# Patient Record
Sex: FEMALE | Race: WHITE
[De-identification: names, ages, dates, MRNs, and addresses within clinical notes are randomized per-mention and may not be internally consistent; named-entity substitution may affect disease eponyms.]

---

## 2020-05-07 ENCOUNTER — EMPLOYEE HEALTH (OUTPATIENT)
Dept: OTHER | Age: 45
End: 2020-05-07

## 2020-05-07 DIAGNOSIS — Z20.822 SUSPECTED COVID-19 VIRUS INFECTION: Primary | ICD-10-CM

## 2020-05-08 ENCOUNTER — EMPLOYEE HEALTH (OUTPATIENT)
Dept: OTHER | Age: 45
End: 2020-05-08

## 2020-05-08 ENCOUNTER — LAB SERVICES (OUTPATIENT)
Dept: LAB | Age: 45
End: 2020-05-08

## 2020-05-08 DIAGNOSIS — Z20.822 SUSPECTED COVID-19 VIRUS INFECTION: ICD-10-CM

## 2020-05-08 DIAGNOSIS — Z20.822 SUSPECTED COVID-19 VIRUS INFECTION: Primary | ICD-10-CM

## 2020-05-08 LAB
SARS-COV-2 RNA SPEC QL NAA+PROBE: NOT DETECTED
SERVICE CMNT-IMP: NORMAL
SPECIMEN SOURCE: NORMAL

## 2020-05-08 PROCEDURE — 87635 SARS-COV-2 COVID-19 AMP PRB: CPT | Performed by: HOSPITALIST

## 2020-05-11 ENCOUNTER — EMPLOYEE HEALTH (OUTPATIENT)
Dept: OTHER | Age: 45
End: 2020-05-11

## 2020-05-29 ENCOUNTER — EMPLOYEE HEALTH (OUTPATIENT)
Dept: OTHER | Age: 45
End: 2020-05-29

## 2020-06-05 ENCOUNTER — EMPLOYEE HEALTH (OUTPATIENT)
Dept: OTHER | Age: 45
End: 2020-06-05

## 2020-06-05 DIAGNOSIS — Z11.59 SCREENING FOR VIRAL DISEASE: Primary | ICD-10-CM

## 2020-06-16 ENCOUNTER — EMPLOYEE HEALTH (OUTPATIENT)
Dept: OTHER | Age: 45
End: 2020-06-16

## 2021-01-22 ENCOUNTER — IMMUNIZATION (OUTPATIENT)
Dept: LAB | Age: 46
End: 2021-01-22

## 2021-01-22 DIAGNOSIS — Z23 NEED FOR VACCINATION: Primary | ICD-10-CM

## 2021-01-22 PROCEDURE — 91300 COVID 19 PFIZER-BIONTECH: CPT

## 2021-01-22 PROCEDURE — 0001A COVID 19 PFIZER-BIONTECH: CPT

## 2021-02-19 ENCOUNTER — IMMUNIZATION (OUTPATIENT)
Dept: LAB | Age: 46
End: 2021-02-19

## 2021-02-19 DIAGNOSIS — Z23 NEED FOR VACCINATION: Primary | ICD-10-CM

## 2021-02-19 PROCEDURE — 0002A COVID 19 PFIZER-BIONTECH: CPT

## 2021-02-19 PROCEDURE — 91300 COVID 19 PFIZER-BIONTECH: CPT

## 2021-12-01 ENCOUNTER — TELEPHONE (OUTPATIENT)
Dept: GENETICS | Age: 46
End: 2021-12-01

## 2021-12-29 ENCOUNTER — EMPLOYEE HEALTH (OUTPATIENT)
Dept: OTHER | Age: 46
End: 2021-12-29

## 2022-07-21 ENCOUNTER — OFFICE VISIT (OUTPATIENT)
Dept: SURGERY | Facility: CLINIC | Age: 47
End: 2022-07-21
Payer: COMMERCIAL

## 2022-07-21 VITALS
SYSTOLIC BLOOD PRESSURE: 122 MMHG | OXYGEN SATURATION: 99 % | HEART RATE: 107 BPM | BODY MASS INDEX: 29.79 KG/M2 | DIASTOLIC BLOOD PRESSURE: 70 MMHG | HEIGHT: 62.5 IN | WEIGHT: 166 LBS

## 2022-07-21 DIAGNOSIS — R63.5 WEIGHT GAIN: ICD-10-CM

## 2022-07-21 DIAGNOSIS — E78.5 DYSLIPIDEMIA: ICD-10-CM

## 2022-07-21 DIAGNOSIS — G70.00 MYASTHENIA GRAVIS (HCC): ICD-10-CM

## 2022-07-21 DIAGNOSIS — J45.909 UNCOMPLICATED ASTHMA, UNSPECIFIED ASTHMA SEVERITY, UNSPECIFIED WHETHER PERSISTENT: ICD-10-CM

## 2022-07-21 DIAGNOSIS — E66.3 PATIENT OVERWEIGHT: ICD-10-CM

## 2022-07-21 DIAGNOSIS — I10 HYPERTENSION, UNSPECIFIED TYPE: Primary | ICD-10-CM

## 2022-07-21 PROCEDURE — 3008F BODY MASS INDEX DOCD: CPT | Performed by: NURSE PRACTITIONER

## 2022-07-21 PROCEDURE — 3074F SYST BP LT 130 MM HG: CPT | Performed by: NURSE PRACTITIONER

## 2022-07-21 PROCEDURE — 99204 OFFICE O/P NEW MOD 45 MIN: CPT | Performed by: NURSE PRACTITIONER

## 2022-07-21 PROCEDURE — 3078F DIAST BP <80 MM HG: CPT | Performed by: NURSE PRACTITIONER

## 2022-07-21 RX ORDER — LISINOPRIL AND HYDROCHLOROTHIAZIDE 20; 12.5 MG/1; MG/1
1 TABLET ORAL DAILY
COMMUNITY
Start: 2022-05-21

## 2022-07-21 RX ORDER — MONTELUKAST SODIUM 10 MG/1
1 TABLET ORAL EVERY MORNING
COMMUNITY
Start: 2022-07-11

## 2022-07-21 RX ORDER — FLUTICASONE PROPIONATE 110 UG/1
2 AEROSOL, METERED RESPIRATORY (INHALATION) 2 TIMES DAILY
COMMUNITY
Start: 2021-04-15

## 2022-07-21 RX ORDER — ALBUTEROL SULFATE 90 UG/1
2 AEROSOL, METERED RESPIRATORY (INHALATION) EVERY 6 HOURS PRN
COMMUNITY
Start: 2022-05-12

## 2022-07-21 RX ORDER — PHENTERMINE HYDROCHLORIDE 37.5 MG/1
37.5 TABLET ORAL
Qty: 30 TABLET | Refills: 1 | Status: SHIPPED | OUTPATIENT
Start: 2022-07-21

## 2022-07-21 RX ORDER — TOPIRAMATE 25 MG/1
25 TABLET ORAL DAILY
Qty: 30 TABLET | Refills: 0 | Status: SHIPPED | OUTPATIENT
Start: 2022-07-21

## 2022-07-21 RX ORDER — FEXOFENADINE HCL 180 MG/1
180 TABLET ORAL AS NEEDED
COMMUNITY

## 2022-07-21 RX ORDER — PREDNISONE 20 MG/1
20 TABLET ORAL EVERY 12 HOURS
COMMUNITY
Start: 2020-01-02

## 2022-07-21 RX ORDER — NORETHINDRONE ACETATE AND ETHINYL ESTRADIOL, ETHINYL ESTRADIOL AND FERROUS FUMARATE 1MG-10(24)
1 KIT ORAL DAILY
COMMUNITY
Start: 2022-05-12

## 2022-07-21 NOTE — PATIENT INSTRUCTIONS
Magnesium glycinate 200 to 500 mg/day for sleep. Pure Encapsulations. Life Extension. NOW. Garden of Life. Or consider Natural Calm. by Elda Platt  Follow dosage instructions. Start 1 teaspoon and increase up to 3 teaspoons as needed for sleep. Record food and drink intake on a written log or phone nas:   Aim for  grams of carbs/day. Aim for 60 grams of protein/day. Aim for a whole, plant strong, low glycemic index dietary pattern. Aim for protein + produce at each meal time. Keep meals between 7 am and 7 pm.    Aim for 3 healthy meals a day with 1-2 healthy snacks as needed. Daily- Aim for:  2 fruits: tomato, avocado, apples, oranges, berries   4 handfuls non-starchy vegetables: greens, peppers, onion, garlic, broccoli, cauliflower, asparagus, brussels sprouts   2 starches: real potato (sweet, white), green beans, carrots, corn, beans, lentils, chickpeas, quinoa, cous cous  3 protein per day: nuts, seeds, plants (lentils, chickpeas, beans), chicken, fish, seafood, turkey, pork, red meat (limited)  Aim for 2 servings healthy fats per day: olives, fatty fish, olive oil, seeds, nuts, avocado, coconut oil. Breakfast ideas:  1. Fruit and nuts/seeds. 2. Eggs scrambled with vegetables, cottage cheese. 3. Oatmeal (stovetop), cinnamon, 2 tbsp flaxseed, berries. 4. Protein shake: 1-2 scoops protein powder, shake with ice and water. Aim for protein and vegetables for lunch and dinner. Healthy Plate method:   1/2 vegetables, 1/4 protein, 1/4 healthy starch (may decrease this portion). Snacks:  Raw vegetables and hummus, apples and peanut butter, nuts, seeds, fruit, pecans with organic honey drizzled. 1. Do a house cleanse, remove unhealthy foods from the house. 2. Aim to eat a whole, plant strong, low glycemic index diet. 3. Focus on the quality of your diet.   4. Get in the habit of recording everything you eat and drink using a food log.  5. Write down all your meals/drinks to stay accountable for what you eat and drink. 6. Find a regular pattern for meals. Aim for 3 healthy meals a day with 1-2 healthy snacks. 7. Plan when, where, and what you will eat at each meal in advance to make sure you do not go too many hours without eating. 8. Include lean protein throughout the day to help steady your appetite. 9. Eat at least 4 servings of vegetables and 2 servings for fruits each day. 10. Add fiber to slow down digestion and keep you full longer. 11. Cut out sugar sweetened beverages. 12. Avoid highly processed carbohydrates. 13. Use the healthy plate method as a reference: Lunch & Dinner plate: 1/2 vegetables (and some fruit), 1/4 protein, 1/4 healthy starch (may decrease this portion). 14. Aim to lose 1 to 2 lbs per week. 15. Become more physically active. Aim for 150 minutes of moderate/vigorous activity per week. 16. Aim to sleep 7-8 hours per night. 17. Stress less. Meditate. 25. Connect with others, loneliness can contribute to disease. 19. Aim to drink at least 64 oz of water a day, you can increase this to half your body weight in ounces/day. Daily Calories:  Track food and beverage intake daily on a phone nas: My Fitness Pal, Carb Manager, Lose It, My Net Diary   120-174 lbs: 1200 calories/day. 175-219 lbs: 1500 calories/day. 220-249 lbs: 1800 calories/day. 250 lbs or more: 2,000 calories/day. Start 1/2 tablet phentermine daily in the AM.    After 2 weeks then start topiramate.

## 2022-07-22 ENCOUNTER — EKG ENCOUNTER (OUTPATIENT)
Dept: LAB | Age: 47
End: 2022-07-22
Attending: NURSE PRACTITIONER
Payer: COMMERCIAL

## 2022-07-22 DIAGNOSIS — R63.5 WEIGHT GAIN: ICD-10-CM

## 2022-07-22 DIAGNOSIS — E66.3 PATIENT OVERWEIGHT: ICD-10-CM

## 2022-07-22 DIAGNOSIS — I10 HYPERTENSION, UNSPECIFIED TYPE: ICD-10-CM

## 2022-07-22 DIAGNOSIS — E78.5 DYSLIPIDEMIA: ICD-10-CM

## 2022-07-22 DIAGNOSIS — J45.909 UNCOMPLICATED ASTHMA, UNSPECIFIED ASTHMA SEVERITY, UNSPECIFIED WHETHER PERSISTENT: ICD-10-CM

## 2022-07-22 DIAGNOSIS — G70.00 MYASTHENIA GRAVIS (HCC): ICD-10-CM

## 2022-07-22 PROCEDURE — 93005 ELECTROCARDIOGRAM TRACING: CPT

## 2022-07-22 PROCEDURE — 93010 ELECTROCARDIOGRAM REPORT: CPT | Performed by: NURSE PRACTITIONER

## 2022-08-15 RX ORDER — PHENTERMINE HYDROCHLORIDE 37.5 MG/1
37.5 TABLET ORAL
Qty: 30 TABLET | Refills: 1 | Status: SHIPPED | OUTPATIENT
Start: 2022-08-15

## 2022-08-15 RX ORDER — TOPIRAMATE 25 MG/1
25 TABLET ORAL DAILY
Qty: 30 TABLET | Refills: 0 | Status: SHIPPED | OUTPATIENT
Start: 2022-08-15

## 2022-08-31 ENCOUNTER — TELEMEDICINE (OUTPATIENT)
Dept: SURGERY | Facility: CLINIC | Age: 47
End: 2022-08-31

## 2022-08-31 VITALS — BODY MASS INDEX: 28 KG/M2 | WEIGHT: 153 LBS

## 2022-08-31 DIAGNOSIS — I10 HYPERTENSION, UNSPECIFIED TYPE: ICD-10-CM

## 2022-08-31 DIAGNOSIS — G70.00 MYASTHENIA GRAVIS (HCC): ICD-10-CM

## 2022-08-31 DIAGNOSIS — E66.3 PATIENT OVERWEIGHT: ICD-10-CM

## 2022-08-31 DIAGNOSIS — E78.5 DYSLIPIDEMIA: Primary | ICD-10-CM

## 2022-08-31 DIAGNOSIS — J45.909 UNCOMPLICATED ASTHMA, UNSPECIFIED ASTHMA SEVERITY, UNSPECIFIED WHETHER PERSISTENT: ICD-10-CM

## 2022-08-31 PROCEDURE — 99214 OFFICE O/P EST MOD 30 MIN: CPT | Performed by: NURSE PRACTITIONER

## 2022-08-31 RX ORDER — TOPIRAMATE 25 MG/1
25 TABLET ORAL DAILY
Qty: 90 TABLET | Refills: 0 | Status: SHIPPED | OUTPATIENT
Start: 2022-08-31

## 2022-10-06 ENCOUNTER — OFFICE VISIT (OUTPATIENT)
Dept: SURGERY | Facility: CLINIC | Age: 47
End: 2022-10-06
Payer: COMMERCIAL

## 2022-10-06 VITALS
BODY MASS INDEX: 26.55 KG/M2 | WEIGHT: 148 LBS | HEIGHT: 62.5 IN | HEART RATE: 95 BPM | DIASTOLIC BLOOD PRESSURE: 70 MMHG | SYSTOLIC BLOOD PRESSURE: 110 MMHG | OXYGEN SATURATION: 99 %

## 2022-10-06 DIAGNOSIS — E66.3 PATIENT OVERWEIGHT: ICD-10-CM

## 2022-10-06 DIAGNOSIS — I10 HYPERTENSION, UNSPECIFIED TYPE: ICD-10-CM

## 2022-10-06 DIAGNOSIS — E78.5 DYSLIPIDEMIA: Primary | ICD-10-CM

## 2022-10-06 DIAGNOSIS — G70.00 MYASTHENIA GRAVIS (HCC): ICD-10-CM

## 2022-10-06 DIAGNOSIS — J45.909 UNCOMPLICATED ASTHMA, UNSPECIFIED ASTHMA SEVERITY, UNSPECIFIED WHETHER PERSISTENT: ICD-10-CM

## 2022-10-06 PROCEDURE — 99214 OFFICE O/P EST MOD 30 MIN: CPT | Performed by: NURSE PRACTITIONER

## 2022-10-06 PROCEDURE — 3008F BODY MASS INDEX DOCD: CPT | Performed by: NURSE PRACTITIONER

## 2022-10-06 PROCEDURE — 3074F SYST BP LT 130 MM HG: CPT | Performed by: NURSE PRACTITIONER

## 2022-10-06 PROCEDURE — 3078F DIAST BP <80 MM HG: CPT | Performed by: NURSE PRACTITIONER

## 2022-10-06 NOTE — PATIENT INSTRUCTIONS
Daily Calories:  120-174 lbs: 1200 calories/day. 175-219 lbs: 1500 calories/day. 220-249 lbs: 1800 calories/day. 250 lbs or more: 2,000 calories/day.

## 2022-10-26 RX ORDER — PHENTERMINE HYDROCHLORIDE 37.5 MG/1
37.5 TABLET ORAL
Qty: 30 TABLET | Refills: 2 | Status: SHIPPED | OUTPATIENT
Start: 2022-10-26

## 2023-01-05 ENCOUNTER — OFFICE VISIT (OUTPATIENT)
Dept: SURGERY | Facility: CLINIC | Age: 48
End: 2023-01-05
Payer: COMMERCIAL

## 2023-01-05 VITALS
SYSTOLIC BLOOD PRESSURE: 115 MMHG | OXYGEN SATURATION: 99 % | DIASTOLIC BLOOD PRESSURE: 73 MMHG | WEIGHT: 134 LBS | BODY MASS INDEX: 24.04 KG/M2 | HEART RATE: 114 BPM | HEIGHT: 62.5 IN

## 2023-01-05 DIAGNOSIS — G70.00 MYASTHENIA GRAVIS (HCC): ICD-10-CM

## 2023-01-05 DIAGNOSIS — J45.909 UNCOMPLICATED ASTHMA, UNSPECIFIED ASTHMA SEVERITY, UNSPECIFIED WHETHER PERSISTENT: ICD-10-CM

## 2023-01-05 DIAGNOSIS — I10 HYPERTENSION, UNSPECIFIED TYPE: ICD-10-CM

## 2023-01-05 DIAGNOSIS — E78.5 DYSLIPIDEMIA: Primary | ICD-10-CM

## 2023-01-05 PROCEDURE — 3074F SYST BP LT 130 MM HG: CPT | Performed by: NURSE PRACTITIONER

## 2023-01-05 PROCEDURE — 3008F BODY MASS INDEX DOCD: CPT | Performed by: NURSE PRACTITIONER

## 2023-01-05 PROCEDURE — 3078F DIAST BP <80 MM HG: CPT | Performed by: NURSE PRACTITIONER

## 2023-01-05 PROCEDURE — 99214 OFFICE O/P EST MOD 30 MIN: CPT | Performed by: NURSE PRACTITIONER

## 2023-01-05 RX ORDER — TOPIRAMATE 25 MG/1
25 TABLET ORAL 2 TIMES DAILY
Qty: 180 TABLET | Refills: 0 | Status: SHIPPED | OUTPATIENT
Start: 2023-01-05

## 2023-01-05 RX ORDER — PHENTERMINE HYDROCHLORIDE 37.5 MG/1
37.5 TABLET ORAL
Qty: 30 TABLET | Refills: 2 | Status: SHIPPED | OUTPATIENT
Start: 2023-01-05

## 2023-04-06 ENCOUNTER — OFFICE VISIT (OUTPATIENT)
Dept: SURGERY | Facility: CLINIC | Age: 48
End: 2023-04-06
Payer: COMMERCIAL

## 2023-04-06 VITALS
WEIGHT: 127 LBS | DIASTOLIC BLOOD PRESSURE: 86 MMHG | OXYGEN SATURATION: 100 % | HEART RATE: 113 BPM | BODY MASS INDEX: 22.79 KG/M2 | HEIGHT: 62.5 IN | SYSTOLIC BLOOD PRESSURE: 141 MMHG

## 2023-04-06 DIAGNOSIS — E78.5 DYSLIPIDEMIA: Primary | ICD-10-CM

## 2023-04-06 DIAGNOSIS — I10 HYPERTENSION, UNSPECIFIED TYPE: ICD-10-CM

## 2023-04-06 DIAGNOSIS — J45.909 UNCOMPLICATED ASTHMA, UNSPECIFIED ASTHMA SEVERITY, UNSPECIFIED WHETHER PERSISTENT: ICD-10-CM

## 2023-04-06 DIAGNOSIS — G70.00 MYASTHENIA GRAVIS (HCC): ICD-10-CM

## 2023-04-06 PROCEDURE — 3008F BODY MASS INDEX DOCD: CPT | Performed by: NURSE PRACTITIONER

## 2023-04-06 PROCEDURE — 3079F DIAST BP 80-89 MM HG: CPT | Performed by: NURSE PRACTITIONER

## 2023-04-06 PROCEDURE — 3077F SYST BP >= 140 MM HG: CPT | Performed by: NURSE PRACTITIONER

## 2023-04-06 PROCEDURE — 99214 OFFICE O/P EST MOD 30 MIN: CPT | Performed by: NURSE PRACTITIONER

## 2023-04-06 RX ORDER — PHENTERMINE HYDROCHLORIDE 37.5 MG/1
37.5 TABLET ORAL
Qty: 30 TABLET | Refills: 2 | Status: SHIPPED | OUTPATIENT
Start: 2023-04-06

## 2023-04-06 RX ORDER — TOPIRAMATE 25 MG/1
25 TABLET ORAL 2 TIMES DAILY
Qty: 180 TABLET | Refills: 1 | Status: SHIPPED | OUTPATIENT
Start: 2023-04-06

## 2023-06-29 ENCOUNTER — OFFICE VISIT (OUTPATIENT)
Dept: SURGERY | Facility: CLINIC | Age: 48
End: 2023-06-29
Payer: COMMERCIAL

## 2023-06-29 VITALS
HEIGHT: 62.5 IN | HEART RATE: 94 BPM | DIASTOLIC BLOOD PRESSURE: 75 MMHG | OXYGEN SATURATION: 99 % | WEIGHT: 125 LBS | BODY MASS INDEX: 22.43 KG/M2 | SYSTOLIC BLOOD PRESSURE: 122 MMHG

## 2023-06-29 DIAGNOSIS — J45.909 UNCOMPLICATED ASTHMA, UNSPECIFIED ASTHMA SEVERITY, UNSPECIFIED WHETHER PERSISTENT: ICD-10-CM

## 2023-06-29 DIAGNOSIS — G70.00 MYASTHENIA GRAVIS (HCC): ICD-10-CM

## 2023-06-29 DIAGNOSIS — I10 HYPERTENSION, UNSPECIFIED TYPE: ICD-10-CM

## 2023-06-29 DIAGNOSIS — E78.5 DYSLIPIDEMIA: Primary | ICD-10-CM

## 2023-06-29 PROCEDURE — 3008F BODY MASS INDEX DOCD: CPT | Performed by: NURSE PRACTITIONER

## 2023-06-29 PROCEDURE — 3074F SYST BP LT 130 MM HG: CPT | Performed by: NURSE PRACTITIONER

## 2023-06-29 PROCEDURE — 3078F DIAST BP <80 MM HG: CPT | Performed by: NURSE PRACTITIONER

## 2023-06-29 PROCEDURE — 99214 OFFICE O/P EST MOD 30 MIN: CPT | Performed by: NURSE PRACTITIONER

## 2023-09-25 ENCOUNTER — OFFICE VISIT (OUTPATIENT)
Dept: SURGERY | Facility: CLINIC | Age: 48
End: 2023-09-25
Payer: COMMERCIAL

## 2023-09-25 VITALS
HEART RATE: 80 BPM | SYSTOLIC BLOOD PRESSURE: 118 MMHG | HEIGHT: 62.4 IN | WEIGHT: 123 LBS | DIASTOLIC BLOOD PRESSURE: 80 MMHG | BODY MASS INDEX: 22.35 KG/M2 | OXYGEN SATURATION: 100 %

## 2023-09-25 DIAGNOSIS — I10 HYPERTENSION, UNSPECIFIED TYPE: ICD-10-CM

## 2023-09-25 DIAGNOSIS — G70.00 MYASTHENIA GRAVIS (HCC): ICD-10-CM

## 2023-09-25 DIAGNOSIS — E78.5 DYSLIPIDEMIA: Primary | ICD-10-CM

## 2023-09-25 DIAGNOSIS — J45.909 UNCOMPLICATED ASTHMA, UNSPECIFIED ASTHMA SEVERITY, UNSPECIFIED WHETHER PERSISTENT: ICD-10-CM

## 2023-09-25 DIAGNOSIS — Z51.81 ENCOUNTER FOR THERAPEUTIC DRUG MONITORING: ICD-10-CM

## 2023-09-25 PROCEDURE — 99214 OFFICE O/P EST MOD 30 MIN: CPT | Performed by: NURSE PRACTITIONER

## 2023-09-25 PROCEDURE — 3074F SYST BP LT 130 MM HG: CPT | Performed by: NURSE PRACTITIONER

## 2023-09-25 PROCEDURE — 3079F DIAST BP 80-89 MM HG: CPT | Performed by: NURSE PRACTITIONER

## 2023-09-25 PROCEDURE — 3008F BODY MASS INDEX DOCD: CPT | Performed by: NURSE PRACTITIONER

## 2023-09-25 NOTE — PATIENT INSTRUCTIONS
Aidee Carcamo's multi-vitamin. 0758-3604 units Vitamin D. Magnesium glycinate 250-500/day. Aim for 45 grams protein/day. 15-20 grams/meal.     3 PM nuts/seeds. Eat within 1-3 hours upon waking. Meals between 7 or 9 AM and 7 PM.     6 days on, 1 day off. [facility] for tracking. Add psyllium husk daily. [place]. Build up to 35-40 grams of fiber/day. Aim for 64 oz of water/day. Aim for a total of:  2 fruits a day (avocado, tomato, citrus/oranges, apples, berries)  1/2 cup or medium size    4 non-starchy vegetables/day (1/2 cup cooked; 1 cup raw) (greens, peppers, onions, garlic, broccoli, cauliflower, brussels sprouts, asparagus, etc.)    0-2 starches/day (oatmeal, sweet potato, carrots, brown rice, etc). 3 protein per day (fish, seafood, meat, or plants: salmon, nuts, seeds, shrimp, chicken, turkey, beans, lentils, chickpeas, etc). 2 healthy fats (avocado, avocado oil, olives, olive oil, salmon, nuts, seeds)    I recommend a whole food, plant powered diet with low glycemic index:     Aim for 3 meals a day and 1-2 snacks as needed. Aim for a protein + produce at each meal time. Breakfast ideas:  1. Fruit and nuts/seeds. 2. Eggs scrambled with vegetables. 3. Oatmeal (stovetop), cinnamon, 2 tbsp flaxseed, berries, nuts. 4. Protein shake + fruit. (1 scoop with water/ice). Garden of [place] [address], [place], [place], and [place] are good brands. Snacks:  Raw vegetables and hummus, apples and peanut butter, nuts, seeds, fruit, pecans drizzled with organic honey. Use the Healthy Plate method for lunch and dinner:  1/2 right side of plate non-starchy vegetables. Bottom left 1/4 plate protein. Top left 1/4 starch as desired. Aim for 150 minutes moderate level exercise weekly with 2-3 days strength training. Add Magnesium glycinate 200 to 500 mg/day for sleep. Life Extension. NOW. Garden of Life. Whole Food Brand. Starla's. Pure Encapsulations.      Or consider Natural Calm. by Paloma Teersa  Follow dosage instructions. Start 1 teaspoon and increase up to 3 teaspoons as needed for sleep.

## 2023-10-18 ENCOUNTER — TELEPHONE (OUTPATIENT)
Dept: SURGERY | Facility: CLINIC | Age: 48
End: 2023-10-18

## 2023-10-25 RX ORDER — TOPIRAMATE 25 MG/1
25 TABLET ORAL 2 TIMES DAILY
Qty: 180 TABLET | Refills: 1 | Status: SHIPPED | OUTPATIENT
Start: 2023-10-25

## 2023-12-08 RX ORDER — PHENTERMINE HYDROCHLORIDE 37.5 MG/1
37.5 TABLET ORAL
Qty: 30 TABLET | Refills: 3 | Status: SHIPPED | OUTPATIENT
Start: 2023-12-08

## 2024-02-21 ENCOUNTER — TELEMEDICINE (OUTPATIENT)
Dept: SURGERY | Facility: CLINIC | Age: 49
End: 2024-02-21
Payer: COMMERCIAL

## 2024-02-21 VITALS — BODY MASS INDEX: 23 KG/M2 | WEIGHT: 124.81 LBS

## 2024-02-21 DIAGNOSIS — Z51.81 ENCOUNTER FOR THERAPEUTIC DRUG MONITORING: ICD-10-CM

## 2024-02-21 DIAGNOSIS — E66.3 PATIENT OVERWEIGHT: ICD-10-CM

## 2024-02-21 DIAGNOSIS — I10 HYPERTENSION, UNSPECIFIED TYPE: ICD-10-CM

## 2024-02-21 DIAGNOSIS — J45.909 UNCOMPLICATED ASTHMA, UNSPECIFIED ASTHMA SEVERITY, UNSPECIFIED WHETHER PERSISTENT (HCC): ICD-10-CM

## 2024-02-21 DIAGNOSIS — E78.5 DYSLIPIDEMIA: Primary | ICD-10-CM

## 2024-02-21 DIAGNOSIS — G70.00 MYASTHENIA GRAVIS (HCC): ICD-10-CM

## 2024-02-21 PROCEDURE — 99214 OFFICE O/P EST MOD 30 MIN: CPT | Performed by: NURSE PRACTITIONER

## 2024-02-21 NOTE — PROGRESS NOTES
Virtual Video & Audio Check-In    Mariana Sellers verbally consents to a Virtual Video & Audio Check-In visit on 24.  Patient has been referred to the [facility] website at [email or web address] to review the yearly Consent to Treat document.    Patient understands and accepts financial responsibility for any deductible, co-insurance and/or co-pays associated with this service.    Duration of the service: 13 minutes.    Summary of topics discussed: Obesity/weight management, Lifestyle and behavior modifications, Medication management.               Patient:  Mariana Sellers  :      1975  MRN:      [id number]    Chief Complaint:    Chief Complaint   Patient presents with    Follow - Up    Weight Management       SUBJECTIVE     History of Present Illness:  Mariana is being seen today for a follow-up for medically supported weight loss.     Initial HPI:  46 yo female.   Reports weight gain began 2018, dad passed away suddenly and then pandemic hit.   Desires guidance on lifestyle modifications.      Patient is considering medications and is not a candidate for bariatric surgery for weight loss.     Patient denies any history of eating disorder(s).     Patient is employed: [employer].   Patient lives with spouse, 2 kids (14, 16).      Patient's goal weight: 130 lbs  Biggest weight loss in the past:  How weight loss was achieved:  Heaviest weight ever: Current   Previous use of medical weight loss medications: None      Physical activity: Walks     Sleep: Night shift     Sleep screening:     Past Medical History:   Past Medical History:   Diagnosis Date    Hypertension     Myasthenia gravis (HCC)         Comorbidities:  Asthma-Improvement?  yes and Hypertension-Improvement?  yes    OBJECTIVE     Vitals: Wt 124 lb 12.8 oz (56.6 kg)   BMI 22.53 kg/m²     Initial weight loss:    Total weight loss: -41   Start weight: 166    Wt Readings from Last 6 Encounters:   24 124 lb 12.8 oz (56.6 kg)    23 123 lb (55.8 kg)   23 125 lb (56.7 kg)   23 127 lb (57.6 kg)   23 134 lb (60.8 kg)   10/06/22 148 lb (67.1 kg)       Patient Medications:    Current Outpatient Medications   Medication Sig Dispense Refill    Phentermine HCl 37.5 MG Oral Tab Take 1 tablet (37.5 mg total) by mouth every morning before breakfast. 30 tablet 3    topiramate 25 MG Oral Tab Take 1 tablet (25 mg total) by mouth 2 (two) times daily. 180 tablet 1    albuterol 108 (90 Base) MCG/ACT Inhalation Aero Soln Inhale 2 puffs into the lungs every 6 (six) hours as needed.      lisinopril-hydroCHLOROthiazide 20-12.5 MG Oral Tab Take 1 tablet by mouth daily.      LO LOESTRIN FE 1 MG-10 MCG / 10 MCG Oral Tab Take 1 tablet by mouth daily.      fexofenadine 180 MG Oral Tab Take 180 mg by mouth as needed.      montelukast 10 MG Oral Tab Take 1 tablet by mouth every morning.      fluticasone propionate 110 MCG/ACT Inhalation Aerosol Inhale 2 puffs into the lungs 2 (two) times daily.       Allergies:  Dust mite extract     Social History:  Reviewed    Surgical History:    Past Surgical History:   Procedure Laterality Date    APPENDECTOMY            x 2    CHOLECYSTECTOMY      HERNIA REPAIR      OTHER      Thymectomy     Family History:  History reviewed. No pertinent family history.    Initial Intake:  2, 12 hour night shifts  Barrier: increased hunger, son travel hockey   After dinner behavior: popcorn   Night eating: -  Portion sizes: -  Binge: -  Emotional: +  Depression: Score: 10  Grazing:   Sweet tooth: -  Crunchy/salty: +  Etoh: Wine 4-5 nights/week   Drinks water, coffee, wine   Soda Drinker: No                     Sports Drinks:  No                  Juice:  No                     Number of restaurant or fast food meals/week: 0-3 meals/week    Food Journal  Reviewed and Discussed:       Patient has a Food Journal?: yes Cronometer twice/week   Patient is reading nutrition labels?  yes  Average Caloric Intake:    1200  Average CHO Intake:   Is patient exercising? yes less though due to rib injury   Type of exercise?   Team Body Project: Yoga, HIIT  Or walks   Indoor bike, eliptical   2 days/week    Eating Habits  Patient states the following:  Eats 2-3 meal(s) per day  Length of time it takes to consume a meal:    # of snacks per day:   Type of snacks:  Nuts, trail mix  Amount of soda consumption per day: None   Amount of water (in ounces) per day:  Likely adequate   Toughest challenge:     Mediterranean Diet   2 twelve night shifts   B:  5 AM Protein shake; apple   D: 4-5 PM [place] or chicken, rare red meat, vegetables, cous cous   S: 10 PM tomato soup    Nutritional Goals  Eat 3-4 cups of fresh fruits or vegetables daily    Behavior Modifications Reviewed and Discussed  Eat breakfast, Eat 3 meals per day, Plan meals in advance, Read nutrition labels, Drink 64 oz of water per day, Maintain a daily food journal and Utlize portion control strategies to reduce calorie intake    Exercise Goals Reviewed and Discussed    Aim for 4 days/week, as above   Aim for 150 minutes moderate level exercise weekly with 2-3 days strength training    ROS:    Constitutional: negative  Respiratory: positive for asthma  Cardiovascular: negative  Gastrointestinal: negative  Integument/breast: negative  Hematologic/lymphatic: negative  Musculoskeletal:negative  Neurological: negative  Behavioral/Psych: negative  Endocrine: negative  All other systems were reviewed and are negative    Physical Exam:  General: alert, oriented x 3, cooperative, speaking in full sentences, appears stated age and cooperative, obese   Head: Normocephalic, without obvious abnormality, atraumatic  Neck: symmetrical, trachea midline   Lungs: No increased work of breathing   Extremities: extremities normal  Skin: Upper body skin color and texture appear intact         ASSESSMENT     Encounter Diagnosis(ses):   Encounter Diagnoses   Name Primary?    Dyslipidemia Yes     Encounter for therapeutic drug monitoring     Hypertension, unspecified type     Uncomplicated asthma, unspecified asthma severity, unspecified whether persistent (HCC)     Myasthenia gravis (HCC)     Patient overweight        PLAN     Diagnoses and all orders for this visit:    Dyslipidemia    Encounter for therapeutic drug monitoring    Hypertension, unspecified type    Uncomplicated asthma, unspecified asthma severity, unspecified whether persistent (HCC)    Myasthenia gravis (HCC)    Patient overweight          HYPERTENSION: Blood pressure stable on the above medications. No interval change in antihypertensive medication.   Discuss dose reduction with pcp.      Mild Dyslipidemia: Recommend dietary changes and lifestyle modifications as discussed below. Monitor.     O/s labs 5/9/22- TC-195, LDL- 120, HDL - 73     OBESITY/WEIGHT GAIN:  ASTHMA:     Recommended patient continue intensive lifestyle and behavioral modifications at this time for weight loss.     Reviewed lifestyle modifications: Whole Food/Plant Strong/Low Glycemic Index diet, moderate alcohol consumption, reduced sodium intake to no more than 2,400 mg/day, and at least 150 minutes of moderate physical activity per week.   Avoid processed, poor quality carbohydrates, refined grains, flour, sugar.     Goals for next month:  1. Keep a food log.   2. Drink 64 ounces of non-caloric beverages per day. No fruit juices or regular soda.  3. Aim for 150 minutes moderate exercise per week.    4. Increase fruit and vegetable servings to 5-6 per day.    5. Improve sleep and stress.      Reviewed labs:  O/s labs 5/9/22- CMP, Vitamin D in range.   Patient will provide labs- done by pcp.      Discussed medication options for weight loss in detail with patient.      Denies hx cardiac disease or substance abuse.     Continue phentermine 1/2 tablet of 37.5 mg by mouth in the AM. Average: 4-5 days/week- weekends off.   Break from medication in November 2022 due to URI.      Continue 25 mg topiramate BID as tolerated.     Consider Qsymia long-term.     Hx Pancreatitis due to gallstone.   Careful with GLP-1 Agonist.      Discussed risks, benefits, rationale, and side effects of medication(s) including hypertension, palpitations, tachycardia, dizziness, constipation, dry mouth, and anxiety among others. She understands that I will not call in the prescription for her; she has to have an appointment to have the medication refilled. Must avoid pregnancy during use, counseled on adequate contraception during use. Patient states understanding of all information and instructions.    EKG done 7/22/22.     Continue Natural Calm/magnesium.     Record food and drink intake on a written log or phone nas:   Aim for  grams of carbs/day.   Aim for 45 grams of protein/day.      Healthy Plate Method.      IF.     Consider RD consult as needed.     Increase strength exercise.     Continue Cronometer tracking    Goal: 130 lbs. Goal met.      RTC 4 months.      MAE Boyce

## 2024-05-02 RX ORDER — TOPIRAMATE 25 MG/1
25 TABLET ORAL 2 TIMES DAILY
Qty: 180 TABLET | Refills: 1 | Status: SHIPPED | OUTPATIENT
Start: 2024-05-02

## 2024-11-06 RX ORDER — TOPIRAMATE 25 MG/1
25 TABLET, FILM COATED ORAL 2 TIMES DAILY
Qty: 60 TABLET | Refills: 0 | Status: SHIPPED | OUTPATIENT
Start: 2024-11-06

## 2024-12-09 RX ORDER — TOPIRAMATE 25 MG/1
25 TABLET, FILM COATED ORAL 2 TIMES DAILY
Qty: 60 TABLET | Refills: 0 | Status: SHIPPED | OUTPATIENT
Start: 2024-12-09

## 2024-12-12 ENCOUNTER — OFFICE VISIT (OUTPATIENT)
Dept: SURGERY | Facility: CLINIC | Age: 49
End: 2024-12-12
Payer: COMMERCIAL

## 2024-12-12 VITALS
DIASTOLIC BLOOD PRESSURE: 80 MMHG | WEIGHT: 136 LBS | HEIGHT: 62.4 IN | BODY MASS INDEX: 24.71 KG/M2 | OXYGEN SATURATION: 98 % | HEART RATE: 109 BPM | SYSTOLIC BLOOD PRESSURE: 126 MMHG

## 2024-12-12 DIAGNOSIS — G70.00 MYASTHENIA GRAVIS (HCC): ICD-10-CM

## 2024-12-12 DIAGNOSIS — E66.3 PATIENT OVERWEIGHT: ICD-10-CM

## 2024-12-12 DIAGNOSIS — I10 HYPERTENSION, UNSPECIFIED TYPE: ICD-10-CM

## 2024-12-12 DIAGNOSIS — Z51.81 ENCOUNTER FOR THERAPEUTIC DRUG MONITORING: ICD-10-CM

## 2024-12-12 DIAGNOSIS — E78.5 DYSLIPIDEMIA: Primary | ICD-10-CM

## 2024-12-12 DIAGNOSIS — J45.909 UNCOMPLICATED ASTHMA, UNSPECIFIED ASTHMA SEVERITY, UNSPECIFIED WHETHER PERSISTENT (HCC): ICD-10-CM

## 2024-12-12 PROCEDURE — 3074F SYST BP LT 130 MM HG: CPT | Performed by: NURSE PRACTITIONER

## 2024-12-12 PROCEDURE — 3008F BODY MASS INDEX DOCD: CPT | Performed by: NURSE PRACTITIONER

## 2024-12-12 PROCEDURE — 3079F DIAST BP 80-89 MM HG: CPT | Performed by: NURSE PRACTITIONER

## 2024-12-12 PROCEDURE — 99214 OFFICE O/P EST MOD 30 MIN: CPT | Performed by: NURSE PRACTITIONER

## 2024-12-12 RX ORDER — TOPIRAMATE 25 MG/1
25 TABLET, FILM COATED ORAL 2 TIMES DAILY
Qty: 180 TABLET | Refills: 1 | Status: SHIPPED | OUTPATIENT
Start: 2024-12-12

## 2024-12-12 RX ORDER — PHENTERMINE HYDROCHLORIDE 37.5 MG/1
37.5 TABLET ORAL
Qty: 30 TABLET | Refills: 3 | Status: SHIPPED | OUTPATIENT
Start: 2024-12-12

## 2024-12-12 NOTE — PROGRESS NOTES
Patient:  Mariana Sellers  :      1975  MRN:      [id number]    Chief Complaint:    Chief Complaint   Patient presents with    Follow - Up    Weight Management       SUBJECTIVE     History of Present Illness:  Mariana is being seen today for a follow-up for medically supported weight loss.     Last OV 2023.     Stopped phentermine two weeks ago- was using medication sparingly since last visit.   Continued on topiramate since last visit.     Family stress.     Initial HPI:  48 yo female.   Reports weight gain began 2018, dad passed away suddenly and then pandemic hit.   Desires guidance on lifestyle modifications.      Patient is considering medications and is not a candidate for bariatric surgery for weight loss.     Patient denies any history of eating disorder(s).     Patient is employed: [employer].   Patient lives with spouse, 2 kids (14, 16).      Patient's goal weight: 130 lbs  Biggest weight loss in the past:  How weight loss was achieved:  Heaviest weight ever: Current   Previous use of medical weight loss medications: None      Physical activity: Walks     Sleep: Night shift     Sleep screening:     Past Medical History:   Past Medical History:    Hypertension    Myasthenia gravis (HCC)        Comorbidities:  Asthma-Improvement?  yes and Hypertension-Improvement?  yes    OBJECTIVE     Vitals: /80   Pulse 109   Ht 5' 2.4\" (1.585 m)   Wt 136 lb (61.7 kg)   SpO2 98%   BMI 24.56 kg/m²     Initial weight loss: +12   Total weight loss: -30   Start weight: 166    Wt Readings from Last 6 Encounters:   24 136 lb (61.7 kg)   24 124 lb 12.8 oz (56.6 kg)   23 123 lb (55.8 kg)   23 125 lb (56.7 kg)   23 127 lb (57.6 kg)   23 134 lb (60.8 kg)       Patient Medications:    Current Outpatient Medications   Medication Sig Dispense Refill    topiramate 25 MG Oral Tab Take 1 tablet (25 mg total) by mouth 2 (two) times daily. 180 tablet 1    Phentermine  HCl 37.5 MG Oral Tab Take 1 tablet (37.5 mg total) by mouth every morning before breakfast. 30 tablet 3    albuterol 108 (90 Base) MCG/ACT Inhalation Aero Soln Inhale 2 puffs into the lungs every 6 (six) hours as needed.      lisinopril-hydroCHLOROthiazide 20-12.5 MG Oral Tab Take 1 tablet by mouth daily.      LO LOESTRIN FE 1 MG-10 MCG / 10 MCG Oral Tab Take 1 tablet by mouth daily.      fexofenadine 180 MG Oral Tab Take 180 mg by mouth as needed.      montelukast 10 MG Oral Tab Take 1 tablet by mouth every morning.      fluticasone propionate 110 MCG/ACT Inhalation Aerosol Inhale 2 puffs into the lungs 2 (two) times daily.       Allergies:  Dust mite extract     Social History:  Reviewed    Surgical History:    Past Surgical History:   Procedure Laterality Date    Appendectomy            x 2    Cholecystectomy      Hernia repair      Other      Thymectomy     Family History:  History reviewed. No pertinent family history.    Initial Intake:  2, 12 hour night shifts  Barrier: increased hunger, son travel hockey   After dinner behavior: popcorn   Night eating: -  Portion sizes: -  Binge: -  Emotional: +  Depression: Score: 10  Grazing:   Sweet tooth: -  Crunchy/salty: +  Etoh: Wine 4-5 nights/week   Drinks water, coffee, wine   Soda Drinker: No                     Sports Drinks:  No                  Juice:  No                     Number of restaurant or fast food meals/week: 0-3 meals/week  Food Journal  Reviewed and Discussed:       Patient has a Food Journal?: no previously Cronometer twice/week   Patient is reading nutrition labels?  yes  Average Caloric Intake:    Average CHO Intake:   Is patient exercising? no less though due to rib injury   Type of exercise?   Previously Team Body Project: Yoga, HIIT, Walks, Indoor bike, eliptical     Eating Habits  Patient states the following:  Eats 2-3 meal(s) per day  Length of time it takes to consume a meal:    # of snacks per day:   Type of snacks:    Amount of  soda consumption per day: None   Amount of water (in ounces) per day:  needs to improve   Toughest challenge: family stress    Mediterranean Diet   Consider [place] Diet  Hello Fresh 2/week    Nutritional Goals  Eat 3-4 cups of fresh fruits or vegetables daily    Behavior Modifications Reviewed and Discussed  Eat breakfast, Eat 3 meals per day, Plan meals in advance, Read nutrition labels, Drink 64 oz of water per day, Maintain a daily food journal and Utlize portion control strategies to reduce calorie intake    Exercise Goals Reviewed and Discussed    Aim for 4 days/week, as above   Aim for 150 minutes moderate level exercise weekly with 2-3 days strength training    ROS:    Constitutional: negative  Respiratory: positive for asthma  Cardiovascular: negative  Gastrointestinal: negative  Integument/breast: negative  Hematologic/lymphatic: negative  Musculoskeletal:negative  Neurological: negative  Behavioral/Psych: negative  Endocrine: negative  All other systems were reviewed and are negative    Physical Exam:  General appearance: alert, appears stated age, cooperative  Head: Normocephalic, without obvious abnormality, atraumatic  Neck: no adenopathy, no carotid bruit, no JVD, supple, symmetrical, trachea midline and thyroid not enlarged, symmetric, no tenderness/mass/nodules  Lungs: clear to auscultation bilaterally  Heart: S1, S2 normal, no murmur, click, rub or gallop, regular rate and rhythm  Abdomen: soft, non-tender; bowel sounds normal; no masses,  no organomegaly   Extremities: intact, no edema   Pulses: 2+ and symmetric  Skin: intact   Neurologic: Grossly normal    ASSESSMENT     Encounter Diagnosis(ses):   Encounter Diagnoses   Name Primary?    Dyslipidemia Yes    Hypertension, unspecified type     Encounter for therapeutic drug monitoring     Uncomplicated asthma, unspecified asthma severity, unspecified whether persistent (HCC)     Myasthenia gravis (HCC)     Patient overweight        PLAN      Diagnoses and all orders for this visit:    Dyslipidemia  -     Phentermine HCl 37.5 MG Oral Tab; Take 1 tablet (37.5 mg total) by mouth every morning before breakfast.    Hypertension, unspecified type  -     Phentermine HCl 37.5 MG Oral Tab; Take 1 tablet (37.5 mg total) by mouth every morning before breakfast.    Encounter for therapeutic drug monitoring    Uncomplicated asthma, unspecified asthma severity, unspecified whether persistent (HCC)    Myasthenia gravis (HCC)    Patient overweight  -     topiramate 25 MG Oral Tab; Take 1 tablet (25 mg total) by mouth 2 (two) times daily.  -     Phentermine HCl 37.5 MG Oral Tab; Take 1 tablet (37.5 mg total) by mouth every morning before breakfast.        HYPERTENSION: Blood pressure stable on the above medications. No interval change in antihypertensive medication.   Discuss dose reduction with pcp.      Mild Dyslipidemia: Recommend dietary changes and lifestyle modifications as discussed below. Monitor.     O/s labs 5/9/22- TC-195, LDL- 120, HDL - 73    O/s labs 10/20/23-  elevated. , HDL 71, Trigs 60     OBESITY/WEIGHT GAIN:  ASTHMA:     Recommended patient continue intensive lifestyle and behavioral modifications at this time for weight loss.     Reviewed lifestyle modifications: Whole Food/Plant Strong/Low Glycemic Index diet, moderate alcohol consumption, reduced sodium intake to no more than 2,400 mg/day, and at least 150 minutes of moderate physical activity per week.   Avoid processed, poor quality carbohydrates, refined grains, flour, sugar.     Goals for next month:  1. Keep a food log.   2. Drink 64 ounces of non-caloric beverages per day. No fruit juices or regular soda.  3. Aim for 150 minutes moderate exercise per week.    4. Increase fruit and vegetable servings to 5-6 per day.    5. Improve sleep and stress.      Reviewed labs:  O/s labs 5/9/22- CMP, Vitamin D in range.   O/s labs 10/20/23-  elevated. , HDL 71, Trigs  60.  Vitamin D 33.   Testosterone, TSH in range.      Discussed medication options for weight loss in detail with patient.      Denies hx cardiac disease or substance abuse.     Restart phentermine 1/2 to full tablet of 37.5 mg by mouth in the AM.     Continue 25 mg topiramate BID as tolerated.     Consider Qsymia long-term.     Hx Pancreatitis due to gallstone.   Careful with GLP-1 Agonist.      Discussed risks, benefits, rationale, and side effects of medication(s) including hypertension, palpitations, tachycardia, dizziness, constipation, dry mouth, and anxiety among others. Must avoid pregnancy during use, counseled on adequate contraception during use. Patient states understanding of all information and instructions.    EKG done 7/22/22.     Continue Natural Calm/magnesium.     Record food and drink intake on a written log or phone nas:   Aim for  grams of carbs/day.   Aim for 45-50 grams of protein/day.      Healthy Plate Method.      IF.     Consider RD consult as needed.     Increase strength exercise.     Restart Cronometer tracking.    Goal: 130 lbs. Goal met.      RTC 4 months.      MAE Boyce

## 2025-08-07 ENCOUNTER — OFFICE VISIT (OUTPATIENT)
Dept: SURGERY | Facility: CLINIC | Age: 50
End: 2025-08-07

## 2025-08-07 VITALS
OXYGEN SATURATION: 98 % | DIASTOLIC BLOOD PRESSURE: 80 MMHG | HEIGHT: 62.4 IN | WEIGHT: 139.81 LBS | HEART RATE: 113 BPM | SYSTOLIC BLOOD PRESSURE: 130 MMHG | BODY MASS INDEX: 25.4 KG/M2

## 2025-08-07 DIAGNOSIS — E66.3 PATIENT OVERWEIGHT: ICD-10-CM

## 2025-08-07 DIAGNOSIS — Z51.81 ENCOUNTER FOR THERAPEUTIC DRUG MONITORING: ICD-10-CM

## 2025-08-07 DIAGNOSIS — I10 HYPERTENSION, UNSPECIFIED TYPE: ICD-10-CM

## 2025-08-07 DIAGNOSIS — G70.00 MYASTHENIA GRAVIS (HCC): ICD-10-CM

## 2025-08-07 DIAGNOSIS — E78.5 DYSLIPIDEMIA: Primary | ICD-10-CM

## 2025-08-07 DIAGNOSIS — J45.909 UNCOMPLICATED ASTHMA, UNSPECIFIED ASTHMA SEVERITY, UNSPECIFIED WHETHER PERSISTENT (HCC): ICD-10-CM

## 2025-08-07 PROCEDURE — 3008F BODY MASS INDEX DOCD: CPT | Performed by: NURSE PRACTITIONER

## 2025-08-07 PROCEDURE — 3079F DIAST BP 80-89 MM HG: CPT | Performed by: NURSE PRACTITIONER

## 2025-08-07 PROCEDURE — 99214 OFFICE O/P EST MOD 30 MIN: CPT | Performed by: NURSE PRACTITIONER

## 2025-08-07 PROCEDURE — 3075F SYST BP GE 130 - 139MM HG: CPT | Performed by: NURSE PRACTITIONER

## 2025-08-07 RX ORDER — TOPIRAMATE 25 MG/1
25 TABLET, FILM COATED ORAL 2 TIMES DAILY
Qty: 180 TABLET | Refills: 1 | Status: SHIPPED | OUTPATIENT
Start: 2025-08-07

## 2025-08-07 RX ORDER — PHENTERMINE HYDROCHLORIDE 37.5 MG/1
37.5 TABLET ORAL
Qty: 30 TABLET | Refills: 3 | Status: SHIPPED | OUTPATIENT
Start: 2025-08-07